# Patient Record
Sex: FEMALE | Race: WHITE | URBAN - METROPOLITAN AREA
[De-identification: names, ages, dates, MRNs, and addresses within clinical notes are randomized per-mention and may not be internally consistent; named-entity substitution may affect disease eponyms.]

---

## 2024-01-30 SDOH — ECONOMIC STABILITY: TRANSPORTATION INSECURITY
IN THE PAST 12 MONTHS, HAS LACK OF TRANSPORTATION KEPT YOU FROM MEETINGS, WORK, OR FROM GETTING THINGS NEEDED FOR DAILY LIVING?: NO

## 2024-01-30 SDOH — ECONOMIC STABILITY: HOUSING INSECURITY
IN THE LAST 12 MONTHS, WAS THERE A TIME WHEN YOU DID NOT HAVE A STEADY PLACE TO SLEEP OR SLEPT IN A SHELTER (INCLUDING NOW)?: NO

## 2024-01-30 SDOH — ECONOMIC STABILITY: INCOME INSECURITY: HOW HARD IS IT FOR YOU TO PAY FOR THE VERY BASICS LIKE FOOD, HOUSING, MEDICAL CARE, AND HEATING?: NOT VERY HARD

## 2024-01-30 SDOH — ECONOMIC STABILITY: FOOD INSECURITY: WITHIN THE PAST 12 MONTHS, YOU WORRIED THAT YOUR FOOD WOULD RUN OUT BEFORE YOU GOT MONEY TO BUY MORE.: NEVER TRUE

## 2024-01-30 SDOH — ECONOMIC STABILITY: FOOD INSECURITY: WITHIN THE PAST 12 MONTHS, THE FOOD YOU BOUGHT JUST DIDN'T LAST AND YOU DIDN'T HAVE MONEY TO GET MORE.: NEVER TRUE

## 2024-02-02 ENCOUNTER — OFFICE VISIT (OUTPATIENT)
Dept: OBGYN | Age: 29
End: 2024-02-02
Payer: COMMERCIAL

## 2024-02-02 VITALS
BODY MASS INDEX: 47.09 KG/M2 | SYSTOLIC BLOOD PRESSURE: 139 MMHG | WEIGHT: 293 LBS | HEIGHT: 66 IN | DIASTOLIC BLOOD PRESSURE: 84 MMHG

## 2024-02-02 DIAGNOSIS — E66.01 MORBID OBESITY (HCC): ICD-10-CM

## 2024-02-02 DIAGNOSIS — Z01.419 WOMEN'S ANNUAL ROUTINE GYNECOLOGICAL EXAMINATION: Primary | ICD-10-CM

## 2024-02-02 DIAGNOSIS — Z30.432 ENCOUNTER FOR IUD REMOVAL: ICD-10-CM

## 2024-02-02 PROCEDURE — 58301 REMOVE INTRAUTERINE DEVICE: CPT | Performed by: NURSE PRACTITIONER

## 2024-02-02 PROCEDURE — 99395 PREV VISIT EST AGE 18-39: CPT | Performed by: NURSE PRACTITIONER

## 2024-02-02 ASSESSMENT — ENCOUNTER SYMPTOMS
DIARRHEA: 0
ABDOMINAL PAIN: 0
RESPIRATORY NEGATIVE: 1
SHORTNESS OF BREATH: 0
CONSTIPATION: 0
VOMITING: 0
NAUSEA: 0
GASTROINTESTINAL NEGATIVE: 1

## 2024-02-02 ASSESSMENT — PATIENT HEALTH QUESTIONNAIRE - PHQ9
SUM OF ALL RESPONSES TO PHQ QUESTIONS 1-9: 0
2. FEELING DOWN, DEPRESSED OR HOPELESS: 0
SUM OF ALL RESPONSES TO PHQ9 QUESTIONS 1 & 2: 0
1. LITTLE INTEREST OR PLEASURE IN DOING THINGS: 0
SUM OF ALL RESPONSES TO PHQ QUESTIONS 1-9: 0

## 2024-02-02 NOTE — PROGRESS NOTES
PRE-OP DIAGNOSIS: IUD removal  POST-OP DIAGNOSIS: Same   PROCEDURE: IUD Removal   Performing Physician: JOHANA Baig CNP  Risks: Risks were reviewed with patient in detail which include breakage of device and/or strings (which then may need to be removed by a specialist under anesthesia), bleeding, and pain.    PROCEDURE:   The speculum was placed and the IUD strings visualized. Using ringed forceps, the IUD string was grasped and the device was removed without difficulty. Bleeding was minimal.     Followup: The patient tolerated the procedure well without complications. Standard post-procedure care is explained and return precautions are given. Patient desires to use nothing for birth control.

## 2024-02-02 NOTE — PROGRESS NOTES
24    Jacque Harmon  1995    Chief Complaint   Patient presents with    Gynecologic Exam     Pt here for annual, is sexually active, regular menses, LMP-2024, bc-IUD, pap-2022-neg. Pt requesting IUD removal. Consent signed.         The patient is a 28 y.o. female,  who presents for her annual exam.  She is amenorrheic due to IUD.  She is  sexually active. She is currently taking birth control.  Birth control method is Kyleena    She reports no gynecological symptoms.      Pap smear history: Her last PAP smear was in .  Her results were normal.    Past Medical History:   Diagnosis Date    Irregular menses     Menorrhagia     Obesity        No past surgical history on file.    Family History   Problem Relation Age of Onset    High Blood Pressure Mother     Depression Mother     Cancer Maternal Grandmother     Diabetes Paternal Grandfather        Social History     Tobacco Use    Smoking status: Never    Smokeless tobacco: Never   Vaping Use    Vaping Use: Never used   Substance Use Topics    Alcohol use: Yes     Comment: occ    Drug use: No       Current Outpatient Medications   Medication Sig Dispense Refill    Levonorgestrel (KYLEENA) IUD 19.5 mg 1 each by IntraUTERine route once (Patient not taking: Reported on 2024)       No current facility-administered medications for this visit.       Allergies   Allergen Reactions    Penicillins Rash             There is no immunization history on file for this patient.    Review of Systems   Constitutional: Negative.  Negative for fatigue.   Respiratory: Negative.  Negative for shortness of breath.    Gastrointestinal: Negative.  Negative for abdominal pain, constipation, diarrhea, nausea and vomiting.   Genitourinary: Negative.    Neurological:  Negative for dizziness.   Psychiatric/Behavioral: Negative.         /84 (Site: Right Upper Arm, Position: Sitting, Cuff Size: Large Adult)   Ht 1.676 m (5' 6\")   Wt 136.1 kg (300 lb)

## 2024-02-05 ENCOUNTER — TELEPHONE (OUTPATIENT)
Dept: OBGYN | Age: 29
End: 2024-02-05

## 2024-02-05 RX ORDER — MEDROXYPROGESTERONE ACETATE 10 MG/1
10 TABLET ORAL DAILY
Qty: 10 TABLET | Refills: 0 | Status: SHIPPED | OUTPATIENT
Start: 2024-02-05 | End: 2024-02-15

## 2024-02-05 NOTE — TELEPHONE ENCOUNTER
Pt had IUD removal on 2/2/24. Pt states she is experiencing more bleeding than expected. Pt states she is filling up a panty liner. Pt just want to know when or if this should cause concern. What would you recommend?